# Patient Record
Sex: MALE | Race: WHITE | Employment: STUDENT | URBAN - METROPOLITAN AREA
[De-identification: names, ages, dates, MRNs, and addresses within clinical notes are randomized per-mention and may not be internally consistent; named-entity substitution may affect disease eponyms.]

---

## 2017-07-24 ENCOUNTER — TELEPHONE (OUTPATIENT)
Dept: INTERNAL MEDICINE CLINIC | Facility: CLINIC | Age: 22
End: 2017-07-24

## 2017-07-24 NOTE — TELEPHONE ENCOUNTER
Please call patient. I sent Rx for Zoloft to pharmacy on [address] [address] and [place].  Just verify this is the correct pharmacy because we have sent to different in the past. Also, he had had side effect of night sweats with this med in the past. Is he still having

## 2017-07-24 NOTE — TELEPHONE ENCOUNTER
LOV: 10/7/16  Future office visit: no upcoming visit  Last labs: 5/10/16 Cmp Cbc Tsh   Last RX: 11/8/16 #30 #5 Refills  Per protocol: Route to provider

## 2017-07-28 ENCOUNTER — HOSPITAL ENCOUNTER (OUTPATIENT)
Age: 22
Discharge: HOME | End: 2017-07-28
Payer: COMMERCIAL

## 2017-07-28 VITALS
RESPIRATION RATE: 16 BRPM | OXYGEN SATURATION: 100 % | SYSTOLIC BLOOD PRESSURE: 135 MMHG | TEMPERATURE: 98 F | DIASTOLIC BLOOD PRESSURE: 72 MMHG | HEART RATE: 69 BPM

## 2017-07-28 DIAGNOSIS — K21.9 GASTROESOPHAGEAL REFLUX DISEASE WITHOUT ESOPHAGITIS: Primary | ICD-10-CM

## 2017-07-28 PROCEDURE — 99214 OFFICE O/P EST MOD 30 MIN: CPT

## 2017-07-28 PROCEDURE — 99213 OFFICE O/P EST LOW 20 MIN: CPT

## 2017-07-28 RX ORDER — OMEPRAZOLE 20 MG/1
20 CAPSULE, DELAYED RELEASE ORAL DAILY
Qty: 30 CAPSULE | Refills: 0 | Status: SHIPPED | OUTPATIENT
Start: 2017-07-28 | End: 2017-08-27

## 2017-07-28 RX ORDER — SUCRALFATE 1 G/1
1 TABLET ORAL
Qty: 30 TABLET | Refills: 0 | Status: SHIPPED | OUTPATIENT
Start: 2017-07-28 | End: 2017-12-29

## 2017-07-28 RX ORDER — MAGNESIUM HYDROXIDE/ALUMINUM HYDROXICE/SIMETHICONE 120; 1200; 1200 MG/30ML; MG/30ML; MG/30ML
30 SUSPENSION ORAL ONCE
Status: COMPLETED | OUTPATIENT
Start: 2017-07-28 | End: 2017-07-28

## 2017-07-28 NOTE — ED INITIAL ASSESSMENT (HPI)
C/O epigastric pain that started yesterday. Worse lying down and after eating. Tried 1 OTC Zantac yesterday w/o much relief.

## 2017-07-29 NOTE — ED PROVIDER NOTES
Patient Seen in: [facility]    History   Patient presents with:  Gastro-esophageal Reflux    Stated Complaint: bad heartburn for 2 days    HPI    Patient is a pleasant 24-year-old male.   Patient went to the [facility] classic reviewed. All other systems reviewed and negative except as noted above. PSFH elements reviewed from today and agreed except as otherwise stated in HPI. Physical Exam   ED Triage Vitals [07/28/17 1858]  BP: 135/72  Pulse: 69  Resp: 16  Temp: 98. and nightly. Qty: 30 tablet Refills: 0    omeprazole 20 MG Oral Capsule Delayed Release  Take 1 capsule (20 mg total) by mouth daily.   Qty: 30 capsule Refills: 0

## 2017-08-03 NOTE — TELEPHONE ENCOUNTER
LM for pt at [phone]  to call back. Sent [facility] message to pt with questions. Hold for response.

## 2017-08-09 NOTE — TELEPHONE ENCOUNTER
No response to multiple attempts. Letter was sent to Pt. Routed as an [address] to close on review if nothing further.

## 2017-12-08 ENCOUNTER — TELEPHONE (OUTPATIENT)
Dept: INTERNAL MEDICINE CLINIC | Facility: CLINIC | Age: 22
End: 2017-12-08

## 2017-12-08 NOTE — TELEPHONE ENCOUNTER
Medication(s) to Refill:   Pending Prescriptions Disp Refills    Sertraline HCl 50 MG Oral Tab 30 tablet 3     Sig: Take 1 tablet (50 mg total) by mouth once daily.            Last Time Medication was Filled:  07/24/17- 30 tablets with 3 refills     Last Of

## 2017-12-29 ENCOUNTER — OFFICE VISIT (OUTPATIENT)
Dept: INTERNAL MEDICINE CLINIC | Facility: CLINIC | Age: 22
End: 2017-12-29

## 2017-12-29 VITALS
SYSTOLIC BLOOD PRESSURE: 100 MMHG | TEMPERATURE: 98 F | RESPIRATION RATE: 16 BRPM | DIASTOLIC BLOOD PRESSURE: 60 MMHG | HEART RATE: 70 BPM | WEIGHT: 141 LBS | BODY MASS INDEX: 21 KG/M2

## 2017-12-29 DIAGNOSIS — Z00.00 ANNUAL PHYSICAL EXAM: Primary | ICD-10-CM

## 2017-12-29 DIAGNOSIS — F32.A DEPRESSION, UNSPECIFIED DEPRESSION TYPE: ICD-10-CM

## 2017-12-29 DIAGNOSIS — F41.9 ANXIETY: ICD-10-CM

## 2017-12-29 PROCEDURE — 99395 PREV VISIT EST AGE 18-39: CPT | Performed by: FAMILY MEDICINE

## 2017-12-29 PROCEDURE — 90471 IMMUNIZATION ADMIN: CPT | Performed by: FAMILY MEDICINE

## 2017-12-29 PROCEDURE — 90686 IIV4 VACC NO PRSV 0.5 ML IM: CPT | Performed by: FAMILY MEDICINE

## 2017-12-29 NOTE — PATIENT INSTRUCTIONS
Prevention Guidelines, Men Ages 25 to 44  Screening tests and vaccines are an important part of managing your health. Health counseling is essential, too. Below are guidelines for these, for men ages 25 to 44.  Talk with your healthcare provider to make s Hepatitis B Men at increased risk for infection – talk with your healthcare provider 3 doses over 6 months; second dose should be given 1 month after the first dose; the third dose should be given at least 2 months after the second dose and at least 4 haroldo © 7529-1169 [employer] [address] [facility] [place]. All rights reserved. This information is not intended as a substitute for professional medical care. Always follow your healthcare professional's instructions.

## 2017-12-29 NOTE — PROGRESS NOTES
HPI:    Patient ID: Jayden Brewer is a 23year old male. HPI Here for annual check-up. Patient has no complaints. 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 Likes this and plans to continue. Living at home. Tries to eat healthy, doesn't exercise. swelling. Skin: Negative for rash. Neurological: Negative for dizziness, weakness, light-headedness, numbness and headaches. Hematological: Negative for adenopathy. Does not bruise/bleed easily. Psychiatric/Behavioral: Negative for dysphoric mood. (50 mg total) by mouth once daily.            Imaging & Referrals:  INFLUENZA VIRUS VACCINE, QUAD, PRESERVATIVE FREE, 0.5 ML       DL#[id number]

## 2018-01-15 ENCOUNTER — TELEPHONE (OUTPATIENT)
Dept: INTERNAL MEDICINE CLINIC | Facility: CLINIC | Age: 23
End: 2018-01-15

## 2018-01-15 NOTE — TELEPHONE ENCOUNTER
Patient states he noticed for the last 1-2 days a pea sized area right groin, uncomfortable, not pain, discomfort down back of right leg. AMS indicates pt should schedule for 1/16/18 if possible for 30 minutes.   Pt notified to use warm compresses and Advi

## 2018-01-15 NOTE — TELEPHONE ENCOUNTER
Patient noticed a lump in the groin area(little bit bigger than a pea) and very painful when you press on it. Patient is also having pain going down his leg.

## 2018-01-16 ENCOUNTER — OFFICE VISIT (OUTPATIENT)
Dept: INTERNAL MEDICINE CLINIC | Facility: CLINIC | Age: 23
End: 2018-01-16

## 2018-01-16 VITALS
HEART RATE: 68 BPM | SYSTOLIC BLOOD PRESSURE: 122 MMHG | DIASTOLIC BLOOD PRESSURE: 82 MMHG | WEIGHT: 141 LBS | RESPIRATION RATE: 16 BRPM | BODY MASS INDEX: 21 KG/M2 | TEMPERATURE: 98 F

## 2018-01-16 DIAGNOSIS — A60.01 HERPES SIMPLEX INFECTION OF PENIS: Primary | ICD-10-CM

## 2018-01-16 DIAGNOSIS — Z11.3 ROUTINE SCREENING FOR STI (SEXUALLY TRANSMITTED INFECTION): ICD-10-CM

## 2018-01-16 PROCEDURE — 87491 CHLMYD TRACH DNA AMP PROBE: CPT | Performed by: FAMILY MEDICINE

## 2018-01-16 PROCEDURE — 99213 OFFICE O/P EST LOW 20 MIN: CPT | Performed by: FAMILY MEDICINE

## 2018-01-16 PROCEDURE — 87591 N.GONORRHOEAE DNA AMP PROB: CPT | Performed by: FAMILY MEDICINE

## 2018-01-16 RX ORDER — VALACYCLOVIR HYDROCHLORIDE 1 G/1
1 TABLET, FILM COATED ORAL EVERY 12 HOURS SCHEDULED
Qty: 20 TABLET | Refills: 0 | Status: SHIPPED | OUTPATIENT
Start: 2018-01-16 | End: 2018-07-22

## 2018-01-16 NOTE — PROGRESS NOTES
HPI:    Patient ID: Katarzyna Carballo is a 23year old male. HPI Here with c/o tender lump in groin for the past couple of days. Started the same time as irritated lesion on penis. Has had body aches, chills as well. No fever that he is aware of.  Hasn't ha with partner and future partners.      Orders Placed This Encounter      Chlamydia/Gc Amplification [E]    Meds This Visit:  Signed Prescriptions Disp Refills    ValACYclovir HCl 1 G Oral Tab 20 tablet 0      Sig: Take 1 tablet (1,000 mg total) by mouth ivone

## 2018-01-17 LAB
C TRACH DNA SPEC QL NAA+PROBE: NEGATIVE
N GONORRHOEA DNA SPEC QL NAA+PROBE: NEGATIVE

## 2018-07-23 RX ORDER — ACYCLOVIR 400 MG/1
TABLET ORAL
Qty: 20 TABLET | Refills: 0 | Status: SHIPPED | OUTPATIENT
Start: 2018-07-23 | End: 2018-10-06

## 2018-07-23 NOTE — TELEPHONE ENCOUNTER
Medication(s) to Refill:   Pending Prescriptions Disp Refills    ACYCLOVIR 400 MG Oral Tab [Pharmacy Med Name: ACYCLOVIR 400MG TABLETS] 20 tablet 0     Sig: TAKE 1 TABLET(1000 MG) BY MOUTH EVERY 12 HOURS             Reason for Medication Refill being sent

## 2018-08-31 ENCOUNTER — APPOINTMENT (OUTPATIENT)
Dept: GENERAL RADIOLOGY | Age: 23
End: 2018-08-31
Attending: PHYSICIAN ASSISTANT
Payer: COMMERCIAL

## 2018-08-31 ENCOUNTER — HOSPITAL ENCOUNTER (OUTPATIENT)
Age: 23
Discharge: HOME | End: 2018-08-31
Payer: COMMERCIAL

## 2018-08-31 VITALS
DIASTOLIC BLOOD PRESSURE: 88 MMHG | HEART RATE: 92 BPM | SYSTOLIC BLOOD PRESSURE: 139 MMHG | RESPIRATION RATE: 18 BRPM | TEMPERATURE: 98 F | OXYGEN SATURATION: 98 %

## 2018-08-31 DIAGNOSIS — Z87.891 SMOKING HISTORY: ICD-10-CM

## 2018-08-31 DIAGNOSIS — R09.89 SENSATION OF FOREIGN BODY IN THROAT: Primary | ICD-10-CM

## 2018-08-31 DIAGNOSIS — R05.9 COUGH: ICD-10-CM

## 2018-08-31 PROCEDURE — 71046 X-RAY EXAM CHEST 2 VIEWS: CPT | Performed by: PHYSICIAN ASSISTANT

## 2018-08-31 PROCEDURE — 99213 OFFICE O/P EST LOW 20 MIN: CPT

## 2018-08-31 RX ORDER — CETIRIZINE HYDROCHLORIDE 10 MG/1
10 TABLET ORAL DAILY
COMMUNITY

## 2018-08-31 NOTE — ED INITIAL ASSESSMENT (HPI)
Here for eval of possible gummy worm stuck in throat. Sts that he had swallowed it wrong Wednesday night and still has the sensation. Denies any SOB.

## 2018-08-31 NOTE — ED PROVIDER NOTES
Patient Seen in: [facility]    History   Patient presents with:  FB in Throat (GI, respiratory)    Stated Complaint: few days poss fb stuck in throat    HPI    57-year-old male here with complaint of foreign body sensation in th SpO2 98%         Physical Exam   Constitutional: He is oriented to person, place, and time. He appears well-developed and well-nourished. HENT:   Head: Normocephalic and atraumatic.    Right Ear: Tympanic membrane, external ear and ear canal normal.   L 1215  ------------------------------------------------------------      MDM       Clinical Impression: Ingested foreign body with cough  Course of Treatment: Please push fluids. Work on smoking sensation.   If any irritation persists in the lung space will

## 2018-10-08 RX ORDER — ACYCLOVIR 400 MG/1
800 TABLET ORAL 2 TIMES DAILY
Qty: 20 TABLET | Refills: 0 | Status: SHIPPED | OUTPATIENT
Start: 2018-10-08 | End: 2019-03-24

## 2018-10-08 NOTE — TELEPHONE ENCOUNTER
E request  Medication(s) to Refill:   Requested Prescriptions     Pending Prescriptions Disp Refills   • ACYCLOVIR 400 MG Oral Tab [Pharmacy Med Name: ACYCLOVIR 400MG TABLETS] 20 tablet 0     Sig: TAKE 1 TABLET(1000 MG) BY MOUTH EVERY 12 HOURS         Reas found for: CHOLEST, TRIG, HDL, LDL, VLDL, TCHDLRATIO, NONHDLC, CHOLHDLRATIO, NONHDLC, CALCNONHDL

## 2019-03-25 RX ORDER — ACYCLOVIR 400 MG/1
TABLET ORAL
Qty: 20 TABLET | Refills: 0 | Status: SHIPPED | OUTPATIENT
Start: 2019-03-25 | End: 2019-11-01

## 2019-03-25 NOTE — TELEPHONE ENCOUNTER
LOV: 1/16/18  Future Visit: none  Last Rx: 10/8/18 20 tabs 0 refill  Last Labs: no recent labs  Per protocol: to provider

## 2019-07-17 ENCOUNTER — OFFICE VISIT (OUTPATIENT)
Dept: INTERNAL MEDICINE CLINIC | Facility: CLINIC | Age: 24
End: 2019-07-17
Payer: COMMERCIAL

## 2019-07-17 VITALS
BODY MASS INDEX: 19.57 KG/M2 | OXYGEN SATURATION: 98 % | RESPIRATION RATE: 22 BRPM | SYSTOLIC BLOOD PRESSURE: 134 MMHG | DIASTOLIC BLOOD PRESSURE: 84 MMHG | HEIGHT: 69.21 IN | WEIGHT: 133.63 LBS | TEMPERATURE: 98 F | HEART RATE: 104 BPM

## 2019-07-17 DIAGNOSIS — S29.012A STRAIN OF THORACIC BACK REGION: Primary | ICD-10-CM

## 2019-07-17 DIAGNOSIS — F41.9 ANXIETY: ICD-10-CM

## 2019-07-17 PROCEDURE — 99214 OFFICE O/P EST MOD 30 MIN: CPT | Performed by: FAMILY MEDICINE

## 2019-07-17 RX ORDER — CYCLOBENZAPRINE HCL 10 MG
10 TABLET ORAL
COMMUNITY
Start: 2019-07-14 | End: 2019-10-01

## 2019-07-17 RX ORDER — NAPROXEN 500 MG/1
500 TABLET ORAL
COMMUNITY
Start: 2019-07-14 | End: 2019-10-01

## 2019-07-17 NOTE — PROGRESS NOTES
HPI:    Patient ID: Radha Pfeiffer is a 26year old male. HPI Here with c/o right sided mid back pain that started when he lifted something heavy at work. [facility] and started on Naproxen and muscle relaxer.  Is taking Naproxen Musculoskeletal:        Thoracic back: He exhibits tenderness. He exhibits normal range of motion, no bony tenderness and no spasm. Back:    Neurological: He is alert. He has normal strength. Psychiatric: He has a normal mood and affect.  His behav

## 2019-10-01 NOTE — PROGRESS NOTES
HPI:    Patient ID: Jayden Brewer is a 26year old male. HPI Here with c/o continued anxiety. Patient notes some depressed mood, lack of motivation and lack of energy to get out and do things.  This has been going on for at least 6 months, most days of risks/benefits/potential side effects and proper use of medication. Discussed at-length need to tell someone close to him he is taking this medication and that it has a potential for increased suicide in patients his age.  Patient states he understands th

## 2019-10-01 NOTE — PROGRESS NOTES
1. Does the person being vaccinated have a history of severe allergic reaction to a previous dose of any influenza vaccine? - NO  2. Does this person being vaccinated have an allergy to eggs? - NO  3.  Does the patient being vaccinated have a moderate to se

## 2019-11-02 RX ORDER — ACYCLOVIR 400 MG/1
400 TABLET ORAL 2 TIMES DAILY
Qty: 20 TABLET | Refills: 0 | Status: SHIPPED | OUTPATIENT
Start: 2019-11-02 | End: 2020-01-16

## 2019-11-02 RX ORDER — FLUOXETINE 10 MG/1
10 TABLET, FILM COATED ORAL DAILY
Qty: 30 TABLET | Refills: 0 | Status: SHIPPED | OUTPATIENT
Start: 2019-11-02 | End: 2019-12-02

## 2019-11-02 NOTE — TELEPHONE ENCOUNTER
Last Office Visit 10/1/19-anxiety & depression    Last CPE 12/29/17    Last refill  ACYCLOVIR 400 MG Oral Tab 20 tablet 0 3/25/2019     FLUoxetine HCl 10 MG Oral Tab 30 tablet 0 10/1/2019     Last labs that are related  5/10/16-cbc    Future appointment  F

## 2019-11-04 NOTE — PROGRESS NOTES
HPI:    Patient ID: Ava Ellis is a 26year old male. HPI Here for f/u on Fluoxetine start. Has been taking daily with no side effects. Told his parents that he is taking the medication and to be aware of any depression symptoms.  No depressed mood s to ER. No orders of the defined types were placed in this encounter.       Meds This Visit:  Requested Prescriptions      No prescriptions requested or ordered in this encounter       Imaging & Referrals:  None       NB#[id number]

## 2019-12-03 RX ORDER — FLUOXETINE 10 MG/1
TABLET, FILM COATED ORAL
Qty: 90 TABLET | Refills: 1 | Status: SHIPPED | OUTPATIENT
Start: 2019-12-03 | End: 2021-07-26

## 2019-12-03 NOTE — TELEPHONE ENCOUNTER
LOV: 11/4/19-anxiety/depression  Last CPE: 12/29/17  Last refill:FLUoxetine HCl 10 MG Oral Tab 11/2/19  Quantity: 30 tablet, 0 refills  Last labs that are related: n/a  Future appointment: None  Protocol: pend    Please approve or deny

## 2020-01-16 RX ORDER — ACYCLOVIR 400 MG/1
TABLET ORAL
Qty: 20 TABLET | Refills: 1 | Status: SHIPPED | OUTPATIENT
Start: 2020-01-16 | End: 2020-07-27

## 2020-01-16 NOTE — TELEPHONE ENCOUNTER
LOV: 11/4/19-anxiety  Last CPE:12/29/17  Last refill:ACYCLOVIR 400 MG Oral Tab-11/2/19  Quantity: 20 tablet, 0 refills  Last labs that are related: n/a  Future appointment: None  Protocol: pend    Please approve or deny

## 2020-07-27 RX ORDER — ACYCLOVIR 400 MG/1
TABLET ORAL
Qty: 20 TABLET | Refills: 1 | Status: SHIPPED | OUTPATIENT
Start: 2020-07-27 | End: 2021-03-15

## 2020-07-27 NOTE — TELEPHONE ENCOUNTER
LOV:11/04/19, Anxiety and Depression, AMS  Last CPE:n/a  Last refill:ACYCLOVIR 400 MG Oral Tab-1/16/20  Quantity:20 tablet, 1 refills  Last labs that are related: no recent labs  Future appointment:No future appointments.   Protocol:pend for provider    Ple

## 2021-03-15 DIAGNOSIS — Z13.0 SCREENING FOR DISORDER OF BLOOD AND BLOOD-FORMING ORGANS: ICD-10-CM

## 2021-03-15 DIAGNOSIS — Z13.220 SCREENING FOR LIPID DISORDERS: ICD-10-CM

## 2021-03-15 DIAGNOSIS — Z13.228 SCREENING FOR METABOLIC DISORDER: ICD-10-CM

## 2021-03-15 DIAGNOSIS — Z11.3 ROUTINE SCREENING FOR STI (SEXUALLY TRANSMITTED INFECTION): ICD-10-CM

## 2021-03-15 DIAGNOSIS — Z00.00 ROUTINE GENERAL MEDICAL EXAMINATION AT A HEALTH CARE FACILITY: Primary | ICD-10-CM

## 2021-03-15 RX ORDER — ACYCLOVIR 400 MG/1
TABLET ORAL
Qty: 20 TABLET | Refills: 1 | Status: SHIPPED | OUTPATIENT
Start: 2021-03-15 | End: 2021-07-09

## 2021-03-15 NOTE — TELEPHONE ENCOUNTER
LOV:11/04/19, Anxiety/depression, AMS  Last CPE: no recent CPE  Last refill:ACYCLOVIR 400 MG Oral Tab, 7/27/20  Quantity:20 tablet, 1 refills  Last labs that are related: no recent labs  Future appointment:No future appointments.   Protocol: pend for provid

## 2021-07-08 DIAGNOSIS — Z13.228 SCREENING FOR METABOLIC DISORDER: ICD-10-CM

## 2021-07-08 DIAGNOSIS — Z13.220 SCREENING FOR LIPID DISORDERS: ICD-10-CM

## 2021-07-08 DIAGNOSIS — Z13.0 SCREENING FOR DISORDER OF BLOOD AND BLOOD-FORMING ORGANS: ICD-10-CM

## 2021-07-08 DIAGNOSIS — Z00.00 ROUTINE GENERAL MEDICAL EXAMINATION AT A HEALTH CARE FACILITY: Primary | ICD-10-CM

## 2021-07-09 RX ORDER — ACYCLOVIR 400 MG/1
TABLET ORAL
Qty: 20 TABLET | Refills: 1 | Status: SHIPPED | OUTPATIENT
Start: 2021-07-09 | End: 2021-07-26

## 2021-07-09 NOTE — TELEPHONE ENCOUNTER
Please call patient to schedule annual check-up.  Fasting lab orders are at [facility].

## 2021-07-09 NOTE — TELEPHONE ENCOUNTER
Last visit- 11/04/2019 anxiety and depression    Last refill-  03/15/2021 acyclovir 400mg QTY20 1R    Last labs-  No recent labs     No future appointments.

## 2021-07-12 NOTE — TELEPHONE ENCOUNTER
CPE   Future Appointments   Date Time Provider Russ Phan   7/26/2021  1:00 PM George Brito, DO [facility] [facility]

## 2021-07-26 ENCOUNTER — OFFICE VISIT (OUTPATIENT)
Dept: INTERNAL MEDICINE CLINIC | Facility: CLINIC | Age: 26
End: 2021-07-26
Payer: COMMERCIAL

## 2021-07-26 VITALS
BODY MASS INDEX: 19.4 KG/M2 | WEIGHT: 131 LBS | DIASTOLIC BLOOD PRESSURE: 60 MMHG | SYSTOLIC BLOOD PRESSURE: 118 MMHG | OXYGEN SATURATION: 98 % | HEIGHT: 69 IN | HEART RATE: 88 BPM | TEMPERATURE: 99 F

## 2021-07-26 DIAGNOSIS — A60.00 GENITAL HERPES SIMPLEX, UNSPECIFIED SITE: ICD-10-CM

## 2021-07-26 DIAGNOSIS — Z00.00 ANNUAL PHYSICAL EXAM: Primary | ICD-10-CM

## 2021-07-26 PROCEDURE — 3008F BODY MASS INDEX DOCD: CPT | Performed by: FAMILY MEDICINE

## 2021-07-26 PROCEDURE — 99395 PREV VISIT EST AGE 18-39: CPT | Performed by: FAMILY MEDICINE

## 2021-07-26 PROCEDURE — 3078F DIAST BP <80 MM HG: CPT | Performed by: FAMILY MEDICINE

## 2021-07-26 PROCEDURE — 3074F SYST BP LT 130 MM HG: CPT | Performed by: FAMILY MEDICINE

## 2021-07-26 RX ORDER — VALACYCLOVIR HYDROCHLORIDE 500 MG/1
500 TABLET, FILM COATED ORAL DAILY
Qty: 90 TABLET | Refills: 3 | Status: SHIPPED | OUTPATIENT
Start: 2021-07-26

## 2021-07-26 NOTE — PATIENT INSTRUCTIONS
Prevention Guidelines, Men Ages 25 to 44  Screening tests and vaccines are an important part of managing your health. A screening test is done to find possible disorders or diseases in people who don't have any symptoms.  The goal is to find a disease ear vaccine 2 doses; the second dose should be given at least 4 weeks after the first dose   Hepatitis A Men at increased risk for infection – talk with your healthcare provider 2 doses given at least 6 months apart   Hepatitis B Men at increased risk for infe be reminded to avoid intentional tanning and tanning beds. 1Those who are 25years of age, who are not up-to-date on their childhood immunizations, should get all appropriate catch-up vaccines recommended by the CDC.    Carlota last reviewed this educat

## 2021-07-26 NOTE — PROGRESS NOTES
HPI/Subjective:   Patient ID: Kailyn Torres is a 23year old male. HPI Here for annual check-up. Patient has no complaints. Has had about 6 herpes outbreaks in the past year.      History/Other:    Past Medical History:   Diagnosis Date   • Acute uppe mouth daily. 90 tablet 3   • cetirizine 10 MG Oral Tab Take 10 mg by mouth daily. • Fexofenadine HCl (ALLEGRA OR)      • Triamcinolone Acetonide (NASACORT ALLERGY 24HR NA) by Nasal route.        Allergies:No Known Allergies    Objective:   Physical Exam

## 2022-07-14 RX ORDER — VALACYCLOVIR HYDROCHLORIDE 500 MG/1
TABLET, FILM COATED ORAL
Qty: 90 TABLET | Refills: 3 | Status: SHIPPED | OUTPATIENT
Start: 2022-07-14

## 2022-07-29 ENCOUNTER — TELEPHONE (OUTPATIENT)
Dept: INTERNAL MEDICINE CLINIC | Facility: CLINIC | Age: 27
End: 2022-07-29

## 2022-07-29 NOTE — TELEPHONE ENCOUNTER
Patient started getting symptoms of Covid yesterday; Headache, cough, congestion, sore throat. Patient tested positive today. Will AMS send something to Pharmacy since patient's Mother was put on medication. Patient is vaccinated.

## 2022-07-29 NOTE — TELEPHONE ENCOUNTER
Discussed with AMS. Pt has no PMH that would qualify him for paxlovid. Attempted to call pt to advise supportive care. VM full. [facility] message sent to pt.

## 2022-08-02 NOTE — TELEPHONE ENCOUNTER
Pt has responded to [facility]. Please see [facility] message.

## 2022-11-11 ENCOUNTER — HOSPITAL ENCOUNTER (OUTPATIENT)
Age: 27
Discharge: HOME | End: 2022-11-11
Attending: EMERGENCY MEDICINE
Payer: COMMERCIAL

## 2022-11-11 VITALS
BODY MASS INDEX: 20.04 KG/M2 | HEIGHT: 70 IN | WEIGHT: 140 LBS | DIASTOLIC BLOOD PRESSURE: 87 MMHG | SYSTOLIC BLOOD PRESSURE: 138 MMHG | TEMPERATURE: 98 F | HEART RATE: 84 BPM | OXYGEN SATURATION: 98 % | RESPIRATION RATE: 18 BRPM

## 2022-11-11 DIAGNOSIS — B02.8 HERPES ZOSTER WITH COMPLICATION: Primary | ICD-10-CM

## 2022-11-11 PROCEDURE — 99203 OFFICE O/P NEW LOW 30 MIN: CPT

## 2022-11-11 PROCEDURE — 99213 OFFICE O/P EST LOW 20 MIN: CPT

## 2022-11-11 RX ORDER — HYDROCODONE BITARTRATE AND ACETAMINOPHEN 5; 325 MG/1; MG/1
1-2 TABLET ORAL EVERY 6 HOURS PRN
Qty: 10 TABLET | Refills: 0 | Status: SHIPPED | OUTPATIENT
Start: 2022-11-11 | End: 2022-11-16

## 2022-11-11 RX ORDER — VALACYCLOVIR HYDROCHLORIDE 1 G/1
1000 TABLET, FILM COATED ORAL 3 TIMES DAILY
Qty: 21 TABLET | Refills: 0 | Status: SHIPPED | OUTPATIENT
Start: 2022-11-11 | End: 2022-11-18

## 2022-11-11 NOTE — ED INITIAL ASSESSMENT (HPI)
Patient reports a rash to his back since Monday.   Patient reports the rash wraps around to his chest.

## 2022-11-11 NOTE — DISCHARGE INSTRUCTIONS
Increase Valtrex to 1000 mg 3 times a day for a week. Tylenol or Advil for pain. Return for new or worsening symptoms.

## 2023-03-30 ENCOUNTER — HOSPITAL ENCOUNTER (OUTPATIENT)
Age: 28
Discharge: HOME | End: 2023-03-30
Attending: EMERGENCY MEDICINE
Payer: COMMERCIAL

## 2023-03-30 VITALS
WEIGHT: 130 LBS | BODY MASS INDEX: 18.61 KG/M2 | HEIGHT: 70 IN | RESPIRATION RATE: 18 BRPM | SYSTOLIC BLOOD PRESSURE: 138 MMHG | DIASTOLIC BLOOD PRESSURE: 87 MMHG | TEMPERATURE: 98 F | HEART RATE: 85 BPM

## 2023-03-30 DIAGNOSIS — J02.9 VIRAL PHARYNGITIS: Primary | ICD-10-CM

## 2023-03-30 LAB — S PYO AG THROAT QL IA.RAPID: NEGATIVE

## 2023-03-30 PROCEDURE — 99212 OFFICE O/P EST SF 10 MIN: CPT

## 2023-03-30 PROCEDURE — 87651 STREP A DNA AMP PROBE: CPT | Performed by: EMERGENCY MEDICINE

## 2023-03-30 NOTE — DISCHARGE INSTRUCTIONS
Rest at home  Motrin or tylenol for symptoms at home  Return to the ER if symptoms worsen or if any other problems arise

## 2023-06-18 DIAGNOSIS — Z13.0 SCREENING FOR DEFICIENCY ANEMIA: ICD-10-CM

## 2023-06-18 DIAGNOSIS — Z13.220 SCREENING, LIPID: Primary | ICD-10-CM

## 2023-06-18 DIAGNOSIS — Z13.1 SCREENING FOR DIABETES MELLITUS: ICD-10-CM

## 2023-06-19 RX ORDER — VALACYCLOVIR HYDROCHLORIDE 500 MG/1
500 TABLET, FILM COATED ORAL DAILY
Qty: 30 TABLET | Refills: 0 | Status: SHIPPED | OUTPATIENT
Start: 2023-06-19

## 2023-06-19 RX ORDER — VALACYCLOVIR HYDROCHLORIDE 500 MG/1
TABLET, FILM COATED ORAL
Qty: 90 TABLET | Refills: 3 | OUTPATIENT
Start: 2023-06-19

## 2023-06-19 NOTE — TELEPHONE ENCOUNTER
Last OV 7/26/21 annual.   Last PE 7/26/21    Last REFILL   VALACYCLOVIR 500 MG Oral Tab 90 tablet 3 7/14/2022       Last LABS overdue. 5/10/16 cbc cmp tsh    No future appointments. Per PROTOCOL    Herpes Agent Protocol Failed 06/18/2023 02:31 PM    Appointment in the past 12 or next 3 months        Please Advise?

## 2023-06-19 NOTE — TELEPHONE ENCOUNTER
Please call patient to schedule annual check-up. Fasting lab orders are at [address]. I cannot continue to refill his meds unless seen in office once yearly. Last office visit was 7/26/21.

## 2025-05-10 ENCOUNTER — APPOINTMENT (OUTPATIENT)
Dept: GENERAL RADIOLOGY | Age: 30
End: 2025-05-10
Attending: NURSE PRACTITIONER
Payer: COMMERCIAL

## 2025-05-10 ENCOUNTER — HOSPITAL ENCOUNTER (OUTPATIENT)
Age: 30
Discharge: HOME | End: 2025-05-10
Payer: COMMERCIAL

## 2025-05-10 VITALS
TEMPERATURE: 98 F | BODY MASS INDEX: 22.22 KG/M2 | HEART RATE: 83 BPM | SYSTOLIC BLOOD PRESSURE: 132 MMHG | DIASTOLIC BLOOD PRESSURE: 80 MMHG | OXYGEN SATURATION: 99 % | RESPIRATION RATE: 18 BRPM | HEIGHT: 69 IN | WEIGHT: 150 LBS

## 2025-05-10 DIAGNOSIS — S69.91XA INJURY OF RIGHT THUMB, INITIAL ENCOUNTER: Primary | ICD-10-CM

## 2025-05-10 DIAGNOSIS — S60.10XA SUBUNGUAL HEMATOMA OF FINGERNAIL, INITIAL ENCOUNTER: ICD-10-CM

## 2025-05-10 PROCEDURE — 73140 X-RAY EXAM OF FINGER(S): CPT | Performed by: NURSE PRACTITIONER

## 2025-05-10 PROCEDURE — 99214 OFFICE O/P EST MOD 30 MIN: CPT

## 2025-05-10 PROCEDURE — 99213 OFFICE O/P EST LOW 20 MIN: CPT

## 2025-05-10 PROCEDURE — 11740 EVACUATION SUBUNGUAL HMTMA: CPT

## 2025-05-10 NOTE — ED PROVIDER NOTES
Patient Seen in: [facility]      History     Chief Complaint   Patient presents with    Finger Injury     Stated Complaint: Finger Inj    Subjective:   This is a 29-year-old male with no significant past medical history.  Presents to immediate care for injury to the right thumb.  Reports he accidentally slammed his thumb in a car door yesterday.  Subungual hematoma noted under the nailbed.  Reports throbbing pain at the tip of the thumb.  No treatment attempted prior to arrival.    The history is provided by the patient.         History of Present Illness               Objective:     Past Medical History:    Acute upper respiratory infection    Bronchitis    Eustachian tube disorder    Fever    Infectious mononucleosis    Malaise and fatigue    Myalgia    Pharyngitis    Pharyngitis, streptococcal    Seasonal allergies              Past Surgical History:   Procedure Laterality Date    Tonsillectomy      with adenoidectomy                Social History     Socioeconomic History    Marital status: Single   Tobacco Use    Smoking status: Some Days     Types: Cigarettes    Smokeless tobacco: Never   Vaping Use    Vaping status: Some Days   Substance and Sexual Activity    Alcohol use: Yes    Drug use: Yes     Types: Cannabis   Other Topics Concern    Caffeine Concern Yes     Comment: 1 c  a day    Exercise No              Review of Systems   Constitutional:  Negative for fever.   Respiratory:  Negative for cough.    Cardiovascular:  Negative for chest pain.   Gastrointestinal:  Negative for abdominal pain.   Musculoskeletal:  Positive for arthralgias and joint swelling. Negative for back pain, neck pain and neck stiffness.   Skin:  Negative for rash.   Neurological:  Negative for numbness and headaches.       Positive for stated complaint: Finger Inj  Other systems are as noted in HPI.  Constitutional and vital signs reviewed.      All other systems reviewed and negative except as noted  above.                  Physical Exam     ED Triage Vitals   BP 05/10/25 1038 132/80   Pulse 05/10/25 1025 83   Resp 05/10/25 1025 18   Temp 05/10/25 1025 98.4 °F (36.9 °C)   Temp src 05/10/25 1025 Oral   SpO2 05/10/25 1025 99 %   O2 Device 05/10/25 1025 None (Room air)       Current Vitals:   Vital Signs  BP: 132/80  Pulse: 83  Resp: 18  Temp: 98.4 °F (36.9 °C)  Temp src: Oral    Oxygen Therapy  SpO2: 99 %  O2 Device: None (Room air)        Physical Exam  Vitals and nursing note reviewed.   Constitutional:       General: He is not in acute distress.     Appearance: Normal appearance. He is not ill-appearing, toxic-appearing or diaphoretic.   HENT:      Head: Normocephalic and atraumatic.      Right Ear: External ear normal.      Left Ear: External ear normal.      Nose: Nose normal.      Mouth/Throat:      Mouth: Mucous membranes are moist.      Pharynx: Oropharynx is clear.   Eyes:      General:         Right eye: No discharge.         Left eye: No discharge.      Extraocular Movements: Extraocular movements intact.      Conjunctiva/sclera: Conjunctivae normal.   Cardiovascular:      Rate and Rhythm: Normal rate.   Pulmonary:      Effort: Pulmonary effort is normal.   Musculoskeletal:      Right hand: Swelling, tenderness and bony tenderness present. No deformity or lacerations. Normal range of motion. Normal capillary refill. Normal pulse.      Cervical back: Neck supple.   Skin:     General: Skin is warm and dry.      Capillary Refill: Capillary refill takes less than 2 seconds.      Findings: No rash.   Neurological:      Mental Status: He is alert and oriented to person, place, and time.   Psychiatric:         Mood and Affect: Mood normal.         Behavior: Behavior normal.           Physical Exam                ED Course   Labs Reviewed - No data to display       Results            Xray right thumb               MDM      Vital signs stable.  Patient is well appearing and non toxic looking.  Presents to the   for right thumb injury.     Differential diagnosis includes but is no limited too sprain, contusion, subungual hematoma, fracture    Large subungual hematoma noted under the nail of the right thumb.  Swelling to the tip of the digit.  No obvious deformity.  Distal CMS intact.    Xrays films reviewed and interpreted by myself. Results show no acute fracture.    We discussed risks and benefits of evacuation of subungual hematoma.  Patient was offered digital block.  He declined.    Area of the nail was cleansed with alcohol and iodine.  Using Bovie area of the large spur the subungual hematoma was opened up.  Subungual hematoma was evacuated.  Patient tolerated with difficulty.    Dc home. Rice instructions reviewed. Tylenol/ibuprofen for pain.  PCP follow up as needed.  Patient verbalized understanding and agreed with plan of care.  All questions were answered.              Medical Decision Making      Disposition and Plan     Clinical Impression:  1. Injury of right thumb, initial encounter    2. Subungual hematoma of fingernail, initial encounter         Disposition:  Discharge  5/10/2025 11:18 am    Follow-up:  La Jones DO  [address] [place] [address] [place] [id number]  [phone]      As needed          Medications Prescribed:  Current Discharge Medication List          Supplementary Documentation:

## (undated) NOTE — LETTER
Date: 7/17/2019    Patient Name: Shazia Smith          To Whom it may concern: This letter has been written at the patient's request. The above patient was seen at the [facility] for treatment of a medical condition.     This patient lizu

## (undated) NOTE — LETTER
08/09/17      Dear Ava Clark,     Multiple attempts have been made to try to get a hold of you via telephone and email with no response.   Dr. Farhan Oden wanted to make sure you were able to  your prescription that she sent to [facility] in [place]